# Patient Record
Sex: FEMALE | Race: WHITE
[De-identification: names, ages, dates, MRNs, and addresses within clinical notes are randomized per-mention and may not be internally consistent; named-entity substitution may affect disease eponyms.]

---

## 2018-08-12 ENCOUNTER — HOSPITAL ENCOUNTER (EMERGENCY)
Dept: HOSPITAL 33 - ED | Age: 83
Discharge: ANOTHER HOSPITAL | End: 2018-08-12
Payer: MEDICARE

## 2018-08-12 VITALS — SYSTOLIC BLOOD PRESSURE: 167 MMHG | HEART RATE: 80 BPM | OXYGEN SATURATION: 96 % | DIASTOLIC BLOOD PRESSURE: 83 MMHG

## 2018-08-12 DIAGNOSIS — S32.502A: Primary | ICD-10-CM
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
## 2018-08-12 LAB
ALBUMIN SERPL-MCNC: 4.7 G/DL (ref 3.5–5)
ALP SERPL-CCNC: 76 U/L (ref 38–126)
ALT SERPL-CCNC: 18 U/L (ref 0–35)
ANION GAP SERPL CALC-SCNC: 18 MEQ/L (ref 5–15)
AST SERPL QL: 28 U/L (ref 14–36)
BASOPHILS # BLD AUTO: 0.02 10*3/UL (ref 0–0.4)
BASOPHILS NFR BLD AUTO: 0.2 % (ref 0–0.4)
BILIRUB BLD-MCNC: 1.2 MG/DL (ref 0.2–1.3)
BUN SERPL-MCNC: 24 MG/DL (ref 7–17)
CALCIUM SPEC-MCNC: 9.9 MG/DL (ref 8.4–10.2)
CHLORIDE SERPL-SCNC: 109 MMOL/L (ref 98–107)
CO2 SERPL-SCNC: 22 MMOL/L (ref 22–30)
CREAT SERPL-MCNC: 0.8 MG/DL (ref 0.52–1.04)
EOSINOPHIL # BLD AUTO: 0 10*3/UL (ref 0–0.5)
GLUCOSE SERPL-MCNC: 148 MG/DL (ref 74–106)
GRANULOCYTES # BLD AUTO: 8.84 10*3/UL (ref 1.4–6.9)
HCT VFR BLD AUTO: 38.7 % (ref 35–47)
HGB BLD-MCNC: 13.6 GM/DL (ref 12–16)
LYMPHOCYTES # SPEC AUTO: 0.88 10*3/UL (ref 1–4.6)
MCH RBC QN AUTO: 32.3 PG (ref 26–32)
MCHC RBC AUTO-ENTMCNC: 35.1 G/DL (ref 32–36)
MONOCYTES # BLD AUTO: 1.34 10*3/UL (ref 0–1.3)
NEUTROPHILS NFR BLD AUTO: 79.8 % (ref 36–66)
PLATELET # BLD AUTO: 213 K/MM3 (ref 150–450)
POTASSIUM SERPLBLD-SCNC: 3.4 MMOL/L (ref 3.5–5.1)
PROT SERPL-MCNC: 7.8 G/DL (ref 6.3–8.2)
RBC # BLD AUTO: 4.21 M/MM3 (ref 4.1–5.4)
SODIUM SERPL-SCNC: 145 MMOL/L (ref 137–145)
WBC # BLD AUTO: 11.1 K/MM3 (ref 4–10.5)

## 2018-08-12 PROCEDURE — 73502 X-RAY EXAM HIP UNI 2-3 VIEWS: CPT

## 2018-08-12 PROCEDURE — 99285 EMERGENCY DEPT VISIT HI MDM: CPT

## 2018-08-12 PROCEDURE — 80053 COMPREHEN METABOLIC PANEL: CPT

## 2018-08-12 PROCEDURE — 36000 PLACE NEEDLE IN VEIN: CPT

## 2018-08-12 PROCEDURE — 51702 INSERT TEMP BLADDER CATH: CPT

## 2018-08-12 PROCEDURE — 93005 ELECTROCARDIOGRAM TRACING: CPT

## 2018-08-12 PROCEDURE — 85025 COMPLETE CBC W/AUTO DIFF WBC: CPT

## 2018-08-12 PROCEDURE — 73552 X-RAY EXAM OF FEMUR 2/>: CPT

## 2018-08-12 PROCEDURE — 36415 COLL VENOUS BLD VENIPUNCTURE: CPT

## 2018-08-12 NOTE — XRAY
Indication: Pain following fall.



Comparison: None



2 views of the left femur demonstrates minimally displaced left superior and

inferior pubic ramus fractures.  Also mild osteopenia and mild scattered

vascular calcifications.  No other bony, articular, or soft tissue

abnormalities.

## 2018-08-12 NOTE — ERPHSYRPT
- History of Present Illness


Source: patient, family


Exam Limitations: other (history of alzheimers)


Patient Subjective Stated Complaint: daughter states she fell this AM.  unknown 

how long down..unsure how long down.. patient unsure of incident due to her 

dementia


Triage Nursing Assessment: alert and confused.  daughter states this is normal 

for her.  daughter states she was unable to get up on her own.  when assisted 

up she c/o pain in left upper leg.. pain on palpation to right upper leg.  

unable to lift leg off of bed.  + pedal pulse present.  foot warm.  denies any 

other injury.  denies hitting head


Timing/Duration: today (sometime before noon)


Severity: moderate


Modifying Factors: Improves With: nothing


Associated Symptoms: other (left hip pain), No nausea, No vomiting, No 

abdominal pain, No shortness of breath, No heartburn, No diaphoresis, No cough, 

No chills, No chest pain, No fever, No headaches, No loss of appetite, No 

malaise, No rash, No syncope, No seizure, No weakness


Immunizations Up to Date:  (unknown)





<FAN SMITH - Last Filed: 08/12/18 19:16>





<JEREL ARZOLA - Last Filed: 08/12/18 21:32>





- History of Present Illness


Time Seen by Provider: 08/12/18 18:25


Physician History: 





This is a 83-year-old white female with history of dementia, Alzheimer's, high 

blood pressure.


She apparently fell this morning sometime between breakfast and was on the 

floor.





Patient apparently had to be helped up she had complaints of pain in her left 

hip after being helped up.


She denies any other complaints.


Patient is  very pleasant however she has no recollection of why she fell.





Past medical history includes high blood pressure, Alzheimer's, dementia.  Past 

surgical history is negative.





 (FAN SMITH)





- Review of Systems


Constitutional: No Fever, No Chills


Eyes: No Symptoms


Ears, Nose, & Throat: No Symptoms


Respiratory: No Cough, No Dyspnea


Cardiac: No Chest Pain, No Edema, No Syncope


Abdominal/Gastrointestinal: No Abdominal Pain, No Nausea, No Vomiting, No 

Diarrhea


Genitourinary Symptoms: No Dysuria


Musculoskeletal: Joint Pain (left hip pain), No Back Pain, No Neck Pain


Skin: No Rash


Neurological: No Dizziness, No Focal Weakness, No Sensory Changes


Psychological: No Symptoms


Endocrine: No Symptoms


Hematologic/Lymphatic: No Symptoms


All Other Systems: Reviewed and Negative





<FAN SMITH - Last Filed: 08/12/18 19:16>





- Past Medical History


Pertinent Past Medical History: Yes





- Past Surgical History


Past Surgical History: Yes





- Social History


Smoking Status: Never smoker


Exposure to second hand smoke: No


Drug Use: none


Patient Lives Alone: No





- Female History


Hx Pregnant Now: No





<FAN SMITH - Last Filed: 08/12/18 19:16>





- Physical Exam


General Appearance: other (well-developed well-nourished elderly white female 

oriented to person and place very pleasant and cooperative to exami)


Eye Exam: PERRL/EOMI, eyes nml inspection


Ears, Nose, Throat Exam: normal ENT inspection, TMs normal, pharynx normal, 

moist mucous membranes


Neck Exam: normal inspection, non-tender, supple, full range of motion


Respiratory Exam: normal breath sounds, lungs clear, No respiratory distress


Cardiovascular Exam: regular rate/rhythm, normal heart sounds, normal 

peripheral pulses


Gastrointestinal/Abdomen Exam: soft, normal bowel sounds, No tenderness, No mass


Back Exam: normal inspection, normal range of motion, No CVA tenderness, No 

vertebral tenderness


Extremity Exam: other (left hip tender with movement and palpation)


Neurologic Exam: alert, cooperative, CNs II-XII nml as tested, normal mood/

affect, nml cerebellar function, nml station & gait, sensation nml, No oriented 

x 3 (oriented to person and place), No motor deficits


Skin Exam: normal color, warm, dry, No rash


**SpO2 Interpretation**: normal (94%)


SpO2: 94


Oxygen Delivery: Room Air





<FAN SMITH - Last Filed: 08/12/18 19:16>





- Nursing Vital Signs


Nursing Vital Signs: 


 Initial Vital Signs











Temperature  98.8 F   08/12/18 17:45


 


Pulse Rate  108 H  08/12/18 17:45


 


Respiratory Rate  18   08/12/18 17:45


 


Blood Pressure  112/66   08/12/18 17:45


 


O2 Sat by Pulse Oximetry  98   08/12/18 17:45








 Pain Scale











Pain Intensity                 0

















- Radiology Exams


  ** Femur


X-ray Interpretation: Interpreted by me (no femur fractures, left inferior and 

superior pubic rami fracture)





  ** Left Hip


X-ray Interpretation: Interpreted by me (No femur fracture, superior and 

inferior pelvic rami fracture)





<FAN SMITH - Last Filed: 08/12/18 19:16>





- Radiology Exams


  ** Femur


X-ray Interpretation: Interpreted by me





<JEREL ARZOLA - Last Filed: 08/12/18 21:32>


Ordered Tests: 


 Active Orders 24 hr











 Category Date Time Status


 


 EKG-ER Only STAT Care  08/12/18 18:42 Active


 


 IV Insertion STAT Care  08/12/18 18:42 Active


 


 FEMUR Stat Exams  08/12/18 18:09 Taken


 


 HIP UNI (2V) INCL PEL IF DONE Stat Exams  08/12/18 18:08 Taken


 


 CBC W DIFF Stat Lab  08/12/18 19:00 Completed


 


 CMP Stat Lab  08/12/18 19:00 Completed











Lab/Rad Data: 


 Laboratory Result Diagrams





 08/12/18 19:00 





 08/12/18 19:00 





 Laboratory Results











  08/12/18 08/12/18 Range/Units





  19:00 19:00 


 


WBC   11.1 H  (4.0-10.5)  K/mm3


 


RBC   4.21  (4.1-5.4)  M/mm3


 


Hgb   13.6  (12.0-16.0)  gm/dl


 


Hct   38.7  (35-47)  %


 


MCV   91.9  ()  fl


 


MCH   32.3 H  (26-32)  pg


 


MCHC   35.1  (32-36)  g/dl


 


RDW   13.4  (11.5-14.0)  %


 


Plt Count   213  (150-450)  K/mm3


 


MPV   9.2  (6-9.5)  fl


 


Gran %   79.8 H  (36.0-66.0)  %


 


Eos # (Auto)   0  (0-0.5)  


 


Absolute Lymphs (auto)   0.88 L  (1.0-4.6)  


 


Absolute Monos (auto)   1.34 H  (0.0-1.3)  


 


Lymphocytes %   7.9 L  (24.0-44.0)  %


 


Monocytes %   12.1 H  (0.0-12.0)  %


 


Eosinophils %   0.0  (0.00-5.0)  %


 


Basophils %   0.2  (0.0-0.4)  %


 


Absolute Granulocytes   8.84 H  (1.4-6.9)  


 


Basophils #   0.02  (0-0.4)  


 


Sodium  145   (137-145)  mmol/L


 


Potassium  3.4 L   (3.5-5.1)  mmol/L


 


Chloride  109 H   ()  mmol/L


 


Carbon Dioxide  22   (22-30)  mmol/L


 


Anion Gap  18.0 H   (5-15)  MEQ/L


 


BUN  24 H   (7-17)  mg/dL


 


Creatinine  0.80   (0.52-1.04)  mg/dL


 


Estimated GFR  > 60.0   ML/MIN


 


Glucose  148 H   ()  mg/dL


 


Calcium  9.9   (8.4-10.2)  mg/dL


 


Total Bilirubin  1.20   (0.2-1.3)  mg/dL


 


AST  28   (14-36)  U/L


 


ALT  18   (0-35)  U/L


 


Alkaline Phosphatase  76   ()  U/L


 


Serum Total Protein  7.8   (6.3-8.2)  g/dL


 


Albumin  4.7   (3.5-5.0)  g/dL














- Progress


Progress: improved





<FAN SMITH - Last Filed: 08/12/18 19:16>





- Progress


Progress: unchanged


Counseled pt/family regarding: diagnosis, need for follow-up, rad results





<JEREL ARZOLA - Last Filed: 08/12/18 21:32>





- Progress


Progress Note: 





08/12/18 19:11


This is a very pleasant 83-year-old white female with history of Alzheimer's 

and dementia as well as high blood pressure,


Who was found down at home lives at assisted living,


She apparently was unable to get up on her own she felt some time between 

breakfast and noon family thinks it was closer to noon.


She is complaining of pain in her left hip with standing.She has pain with 

movement of her left hip pain with standing on her left leg.


She denies any other complaints.


X-ray of the patient's left hip and left femur failed to show fractures of the 

femur however there is both the superior inferior pubic rami fracture.





Patient appears to be stable she is not in pain if she is not trying to stand.





I have ordered CBC CMP EKG on this patient.


It is likely that the patient will be transferred t as she will be unable to 

walk.





And will need pain control.


.


Once labs are available Will anticipate transfer to facility where her family 

physician 


 can see her.( Dr Wheat)





It is shift change.


Case will be transferred to Dr. Arzola.


Due to shift change case has been discussed with Dr. Arzola.





08/12/18 19:15


 (FAN SMITH)








08/12/18 19:23


Pt care discussed and care accepted from Dr Smith at 19:00. (JEERL ARZOLA)





<FAN SMITH - Last Filed: 08/12/18 19:16>





- Departure


Time of Disposition: 21:31


Departure Disposition: Transfer (Transfer to [facility] per Dr Love.)


Critical Care Time: No





<JEREL ARZOLA - Last Filed: 08/12/18 21:32>





- Departure


Clinical Impression: 


 Pelvic fracture





Condition: Stable


Referrals: 


MARIANGEL DAMON [Primary Care Provider] -

## 2018-08-12 NOTE — XRAY
Indication: Pain following fall.



Comparison: None



AP pelvis and 2 views of the left hip demonstrates minimally displaced left

superior and inferior pubic ramus fractures.  Also mild osteopenia, lower

lumbar degenerative spondylosis, and mild scattered vascular calcifications.

No other bony, articular, or soft tissue abnormalities.